# Patient Record
(demographics unavailable — no encounter records)

---

## 2025-06-10 NOTE — DISCUSSION/SUMMARY
[FreeTextEntry1] :  I spent 40 minutes caring for this patient, including time spent before the visit reviewing the chart, time spent during the visit, and time spent after the visit on documentation, excluding all procedures including ultrasounds.

## 2025-06-10 NOTE — PHYSICAL EXAM
[Appropriately responsive] : appropriately responsive [Alert] : alert [No Acute Distress] : no acute distress [Soft] : soft [Non-distended] : non-distended [Non-tender] : non-tender [No Lesions] : no lesions [Oriented x3] : oriented x3

## 2025-06-10 NOTE — PLAN
[FreeTextEntry1] : 26yo  @ 11w by US, presenting today for termination of pregnancy.   Options Counseling: The patient was counseled about her pregnancy options of parenthood, adoption and . She expressed her firm decision for pregnancy termination.    Risks and benefits of the procedure were explained, including risk of bleeding, infection, uterine perforation and damage to nearby structures, cervical laceration, retained products of conception and need for re-aspiration.   Patient will be scheduled for D and C procedure. She was given doxycycline 200 mg PO to take at home with dinner tonight to take the night before the procedure and will receive an additional 100 mg PO in recovery after her procedure. Rx for 800mg Ibuprofen sent to pharmacy   We reviewed preop and postoperative instructions with her, including NPO instructions.  Labs: outside records requested

## 2025-06-10 NOTE — HISTORY OF PRESENT ILLNESS
[FreeTextEntry1] : 26yo  LMP 3/3, currently breastfeeding, presenting today for termination of pregnancy. She as US in emergency department on 25. Reports good support.   ObHx:  x2  PMH: Denies, denies substance use or psych history  PSH: Denies Meds: Denies Allergies: NKDA SocHx: Lives with her  and children, feels safe at home, works from home